# Patient Record
Sex: FEMALE | Race: WHITE | NOT HISPANIC OR LATINO | URBAN - METROPOLITAN AREA
[De-identification: names, ages, dates, MRNs, and addresses within clinical notes are randomized per-mention and may not be internally consistent; named-entity substitution may affect disease eponyms.]

---

## 2020-06-29 ENCOUNTER — APPOINTMENT (OUTPATIENT)
Dept: RADIOLOGY | Facility: MEDICAL CENTER | Age: 35
End: 2020-06-29
Payer: COMMERCIAL

## 2020-06-29 ENCOUNTER — OFFICE VISIT (OUTPATIENT)
Dept: URGENT CARE | Facility: MEDICAL CENTER | Age: 35
End: 2020-06-29
Payer: COMMERCIAL

## 2020-06-29 VITALS
OXYGEN SATURATION: 99 % | RESPIRATION RATE: 20 BRPM | HEART RATE: 77 BPM | TEMPERATURE: 98 F | SYSTOLIC BLOOD PRESSURE: 120 MMHG | HEIGHT: 65 IN | BODY MASS INDEX: 19.99 KG/M2 | DIASTOLIC BLOOD PRESSURE: 85 MMHG | WEIGHT: 120 LBS

## 2020-06-29 DIAGNOSIS — M54.2 NECK PAIN: ICD-10-CM

## 2020-06-29 DIAGNOSIS — R07.81 RIB PAIN ON RIGHT SIDE: ICD-10-CM

## 2020-06-29 DIAGNOSIS — M54.2 NECK PAIN: Primary | ICD-10-CM

## 2020-06-29 PROCEDURE — G0382 LEV 3 HOSP TYPE B ED VISIT: HCPCS | Performed by: PHYSICIAN ASSISTANT

## 2020-06-29 PROCEDURE — 71101 X-RAY EXAM UNILAT RIBS/CHEST: CPT

## 2020-06-29 PROCEDURE — 72040 X-RAY EXAM NECK SPINE 2-3 VW: CPT

## 2020-06-29 RX ORDER — NAPROXEN 500 MG/1
500 TABLET ORAL 2 TIMES DAILY WITH MEALS
Qty: 20 TABLET | Refills: 0 | Status: SHIPPED | OUTPATIENT
Start: 2020-06-29 | End: 2020-07-09

## 2020-06-29 RX ORDER — METHOCARBAMOL 500 MG/1
500 TABLET, FILM COATED ORAL 4 TIMES DAILY
Qty: 20 TABLET | Refills: 0 | Status: SHIPPED | OUTPATIENT
Start: 2020-06-29 | End: 2020-07-04

## 2020-07-07 ENCOUNTER — OFFICE VISIT (OUTPATIENT)
Dept: URGENT CARE | Facility: MEDICAL CENTER | Age: 35
End: 2020-07-07
Payer: COMMERCIAL

## 2020-07-07 VITALS — OXYGEN SATURATION: 96 % | RESPIRATION RATE: 16 BRPM | TEMPERATURE: 100.6 F | HEART RATE: 100 BPM

## 2020-07-07 DIAGNOSIS — R50.9 FEVER, UNSPECIFIED FEVER CAUSE: Primary | ICD-10-CM

## 2020-07-07 DIAGNOSIS — R52 GENERALIZED BODY ACHES: ICD-10-CM

## 2020-07-07 PROCEDURE — U0003 INFECTIOUS AGENT DETECTION BY NUCLEIC ACID (DNA OR RNA); SEVERE ACUTE RESPIRATORY SYNDROME CORONAVIRUS 2 (SARS-COV-2) (CORONAVIRUS DISEASE [COVID-19]), AMPLIFIED PROBE TECHNIQUE, MAKING USE OF HIGH THROUGHPUT TECHNOLOGIES AS DESCRIBED BY CMS-2020-01-R: HCPCS | Performed by: PHYSICIAN ASSISTANT

## 2020-07-07 PROCEDURE — G0382 LEV 3 HOSP TYPE B ED VISIT: HCPCS | Performed by: PHYSICIAN ASSISTANT

## 2020-07-07 NOTE — PROGRESS NOTES
[facility]        NAME: Raisa Tariq is a 38 y o  female  : 1985    MRN: [id number]  DATE: 2020  TIME: 6:15 PM    Assessment and Plan   Fever, unspecified fever cause [R50 9]  1  Fever, unspecified fever cause  MISC COVID-19 TEST- Office Collection   2  Generalized body aches       COVID-19 swab performed due to patient's symptoms  Advised use Tylenol or Motrin for pain or fever and isolate until results available  Patient Instructions   Tylenol or Motrin for pain or fever  Drink plenty of fluids and get rest  Follow up with PCP in 3-5 days  Proceed to  ER if symptoms worsen  Chief Complaint     Chief Complaint   Patient presents with    Fever    Generalized Body Aches         History of Present Illness       Patient is a 53-year-old female who presents today with complaints of fever and body aches for the past week  Patient states she was bit by a spider 10 days ago and area on right thigh did become red but has now cleared up and is no longer painful  She reports neck pain as well as the myalgias  No stiffness  Reports Motrin does control her fever  No sore throat, cough, congestion, shortness of breath  Review of Systems   Review of Systems   Constitutional: Positive for fatigue and fever  Negative for chills  HENT: Negative for congestion and sore throat  Respiratory: Negative for cough and shortness of breath  Cardiovascular: Negative for chest pain  Musculoskeletal: Positive for myalgias and neck pain  Negative for neck stiffness  Skin: Negative for color change, rash and wound           Current Medications       Current Outpatient Medications:     methocarbamol (ROBAXIN) 500 mg tablet, Take 1 tablet (500 mg total) by mouth 4 (four) times a day for 5 days, Disp: 20 tablet, Rfl: 0    naproxen (NAPROSYN) 500 mg tablet, Take 1 tablet (500 mg total) by mouth 2 (two) times a day with meals for 10 days, Disp: 20 tablet, Rfl: 0    Current Allergies Allergies as of 07/07/2020 - Reviewed 06/29/2020   Allergen Reaction Noted    Pollen extract  03/09/2018            The following portions of the patient's history were reviewed and updated as appropriate: allergies, current medications, past family history, past medical history, past social history, past surgical history and problem list      No past medical history on file  No past surgical history on file  No family history on file  Medications have been verified  Objective   Pulse 100   Temp (!) 100 6 °F (38 1 °C)   Resp 16   SpO2 96%        Physical Exam     Physical Exam   Constitutional: She appears well-developed and well-nourished  No distress  HENT:   Head: Normocephalic and atraumatic  Nose: Nose normal    Mouth/Throat: Uvula is midline, oropharynx is clear and moist and mucous membranes are normal  Tonsils are 0 on the right  Tonsils are 0 on the left  No tonsillar exudate  Eyes: Pupils are equal, round, and reactive to light  Conjunctivae are normal    Neck: Neck supple  Muscular tenderness present  No spinous process tenderness present  No neck rigidity  No edema, no erythema and normal range of motion present  Cardiovascular: Normal rate and regular rhythm  Pulmonary/Chest: Effort normal and breath sounds normal    Lymphadenopathy:     She has no cervical adenopathy  Skin: Skin is warm and dry  No lesion and no rash noted

## 2020-07-10 ENCOUNTER — OFFICE VISIT (OUTPATIENT)
Dept: URGENT CARE | Facility: MEDICAL CENTER | Age: 35
End: 2020-07-10
Payer: COMMERCIAL

## 2020-07-10 VITALS
TEMPERATURE: 99.4 F | BODY MASS INDEX: 20.69 KG/M2 | RESPIRATION RATE: 18 BRPM | DIASTOLIC BLOOD PRESSURE: 88 MMHG | HEIGHT: 65 IN | HEART RATE: 104 BPM | SYSTOLIC BLOOD PRESSURE: 124 MMHG | WEIGHT: 124.2 LBS | OXYGEN SATURATION: 95 %

## 2020-07-10 DIAGNOSIS — M54.2 NECK PAIN: ICD-10-CM

## 2020-07-10 DIAGNOSIS — R51.9 ACUTE NONINTRACTABLE HEADACHE, UNSPECIFIED HEADACHE TYPE: Primary | ICD-10-CM

## 2020-07-10 DIAGNOSIS — H53.8 BLURRY VISION: ICD-10-CM

## 2020-07-10 DIAGNOSIS — R42 DIZZINESS: ICD-10-CM

## 2020-07-10 DIAGNOSIS — M43.6 NECK STIFFNESS: ICD-10-CM

## 2020-07-10 DIAGNOSIS — R10.31 RIGHT LOWER QUADRANT ABDOMINAL PAIN: ICD-10-CM

## 2020-07-10 PROCEDURE — G0382 LEV 3 HOSP TYPE B ED VISIT: HCPCS | Performed by: PHYSICIAN ASSISTANT

## 2020-07-10 NOTE — PATIENT INSTRUCTIONS
Discussed concerns with patient at length  Patient is alert, oriented and capable of making own medical decisions  Patient is declining ER transfer at this time  Patient understands the risks of leaving AMA/not going to the ED for further evaluation such as worsening condition, disability, or even death  Patient verbalizes good understanding and continues to decline ER transfer  Patient refused to sign AMA form

## 2020-07-10 NOTE — PROGRESS NOTES
[facility]        NAME: Ines Mcguire is a 38 y o  female  : 1985    MRN: [id number]  DATE: July 10, 2020  TIME: 3:38 PM    Assessment and Plan   Acute nonintractable headache, unspecified headache type [R51]  1  Acute nonintractable headache, unspecified headache type     2  Dizziness     3  Neck pain     4  Neck stiffness     5  Blurry vision     6  Right lower quadrant abdominal pain       She does not know what type of bug bit her/did not see it and did not remove any ticks  Explained multiple concerns at length with patient and urged patient to go to the emergency department for further evaluation and workup  Patient refusing ER transfer  Patient is alert, oriented capable of making her own medical decisions  Again explained concerns at length with patient and recommend ER transfer- She continues to refuse ER transfer at this time  Patient Instructions     Discussed concerns with patient at length  Patient is alert, oriented and capable of making own medical decisions  Patient is declining ER transfer at this time  Patient understands the risks of leaving AMA/not going to the ED for further evaluation such as worsening condition, disability, immobility or even death  Patient verbalizes good understanding and continues to decline ER transfer  Patient refused to sign AMA form  Chief Complaint     Chief Complaint   Patient presents with    Tick Bite     Pt had a tick bite on her inner right thigh 2 weeks ago  Symptoms began shortly after the bite   Headache    Fatigue    Chills    Generalized Body Aches         History of Present Illness       39 y/o female presents with severe headache, dizziness, intermittent blurry vision, generalized body aches, neck pain, neck stiffness, fevers, some intermittent abdominal pain, fatigue and chills  Patient states her headache is severe, 10/10, not improving with ibuprofen    States he has never had a headache like this before, denies any history of headaches  States her neck pain is achy, feels very stiff and pain with bending her neck forward  She states about 2 weeks ago she was hiking and thought she had a bug bite on her right thigh- states she has not seen any ticks, does not know what kind a bug bit her, did not remove any ticks  States the rash completely resolved, denies any further rashes  States she was seen 3 days ago for COVID testing but denies any cough or cold-like symptoms  States that when she started having the fevers the body aches  States the headache has been getting worse since then  Describes the dizziness as feeling off is been having intermittent blurry vision  States occasional lower abdominal pain but denies any nausea, vomiting or diarrhea  Denies any urinary symptoms  Denies any chest pain, chest tightness, wheezing, shortness of breath, eye pain, pain with eye movement, or other complaints      Review of Systems   Review of Systems   Constitutional: Positive for chills, fatigue and fever  Negative for activity change and appetite change  HENT: Negative for congestion, ear pain, facial swelling, postnasal drip, rhinorrhea, sinus pressure, sore throat, trouble swallowing and voice change  Eyes: Positive for visual disturbance  Negative for discharge and itching  Respiratory: Negative for cough, chest tightness, shortness of breath and wheezing  Cardiovascular: Negative for chest pain  Gastrointestinal: Positive for abdominal pain  Negative for diarrhea, nausea and vomiting  Genitourinary: Negative  Musculoskeletal: Positive for myalgias  Negative for back pain and neck pain  Skin: Negative for rash  Neurological: Positive for dizziness and headaches  Negative for syncope, weakness and numbness  All other systems reviewed and are negative          Current Medications       Current Outpatient Medications:     methocarbamol (ROBAXIN) 500 mg tablet, Take 1 tablet (500 mg total) by mouth 4 (four) times a day for 5 days, Disp: 20 tablet, Rfl: 0    naproxen (NAPROSYN) 500 mg tablet, Take 1 tablet (500 mg total) by mouth 2 (two) times a day with meals for 10 days, Disp: 20 tablet, Rfl: 0    Current Allergies     Allergies as of 07/10/2020 - Reviewed 07/10/2020   Allergen Reaction Noted    Pollen extract  03/09/2018            The following portions of the patient's history were reviewed and updated as appropriate: allergies, current medications, past family history, past medical history, past social history, past surgical history and problem list      History reviewed  No pertinent past medical history  History reviewed  No pertinent surgical history  Family History   Problem Relation Age of Onset    Hypothyroidism Mother     No Known Problems Father          Medications have been verified  Objective   /88   Pulse 104   Temp 99 4 °F (37 4 °C) (Temporal)   Resp 18   Ht 5' 4 5" (1 638 m)   Wt 56 3 kg (124 lb 3 2 oz)   LMP 06/24/2020 (Exact Date)   SpO2 95%   BMI 20 99 kg/m²        Physical Exam     Physical Exam   Constitutional: She is oriented to person, place, and time  She appears well-developed and well-nourished  No distress  HENT:   Head: Normocephalic and atraumatic  Right Ear: Tympanic membrane normal    Left Ear: Tympanic membrane normal    Eyes: Pupils are equal, round, and reactive to light  EOM are normal    No nystagmus, no pain with EOMs   Neck: Normal range of motion  Neck supple  No spinous process tenderness present  Pain with forward flexion of the neck   Cardiovascular: Regular rhythm and normal heart sounds  Tachycardia present  Pulmonary/Chest: Effort normal and breath sounds normal  She has no wheezes  Abdominal: Soft  Bowel sounds are normal  There is tenderness in the right lower quadrant  There is no guarding  Neurological: She is alert and oriented to person, place, and time  She has normal reflexes  No sensory deficit   Coordination normal    NV intact with sensation and strong peripheral pulses  5/5 strength of UE/LE bilaterally  No focal neurological deficits  Patient was getting dizzy during Romberg testing   Skin: Capillary refill takes less than 2 seconds  Psychiatric: She has a normal mood and affect  Her behavior is normal    Nursing note and vitals reviewed

## 2020-07-17 LAB — SARS-COV-2 RNA SPEC QL NAA+PROBE: NOT DETECTED

## 2020-07-18 ENCOUNTER — TELEPHONE (OUTPATIENT)
Dept: URGENT CARE | Facility: MEDICAL CENTER | Age: 35
End: 2020-07-18

## 2020-07-20 ENCOUNTER — TELEPHONE (OUTPATIENT)
Dept: OTHER | Facility: OTHER | Age: 35
End: 2020-07-20

## 2020-07-20 NOTE — TELEPHONE ENCOUNTER
The patient was called for notification of a NEGATIVE test result for COVID-19  The patient did not answer the phone and a voicemail was left requesting a call back to [phone], Option 7

## 2023-08-31 ENCOUNTER — OFFICE VISIT (OUTPATIENT)
Dept: GYNECOLOGY | Facility: CLINIC | Age: 38
End: 2023-08-31
Payer: COMMERCIAL

## 2023-08-31 VITALS
BODY MASS INDEX: 21.73 KG/M2 | SYSTOLIC BLOOD PRESSURE: 120 MMHG | WEIGHT: 130.4 LBS | HEIGHT: 65 IN | DIASTOLIC BLOOD PRESSURE: 78 MMHG

## 2023-08-31 DIAGNOSIS — Z12.31 ENCOUNTER FOR SCREENING MAMMOGRAM FOR MALIGNANT NEOPLASM OF BREAST: ICD-10-CM

## 2023-08-31 DIAGNOSIS — Z01.419 ROUTINE GYNECOLOGICAL EXAMINATION: Primary | ICD-10-CM

## 2023-08-31 PROCEDURE — G0145 SCR C/V CYTO,THINLAYER,RESCR: HCPCS | Performed by: OBSTETRICS & GYNECOLOGY

## 2023-08-31 PROCEDURE — S0610 ANNUAL GYNECOLOGICAL EXAMINA: HCPCS | Performed by: OBSTETRICS & GYNECOLOGY

## 2023-08-31 NOTE — PROGRESS NOTES
Assessment/Plan:    Normal breast and GYN exam  Normal Pap smear 2018  Status post spontaneous vaginal delivery   Regular menstrual cycles    Plan: Check Pap smear. Continue healthy diet and exercise. Recommend daily Kegels. Subjective:   2018 boy,  girl     Patient ID: Gregor Cool is a 43 y.o. female presents to the office as a new patient with no complaints. Regular menstrual cycles. Spontaneous vaginal delivery 2022. Recently finished breast-feeding. Not interested in any birth control. Denies any pelvic pain abnormal bleeding or dyspareunia. Denies any breast bowel or bladder issues. No change in family history. Paternal aunt (uterine cancer. Medications reviewed. Working full-time as a private physical therapist.  Exercising regularly and eating extremely healthy. Review of Systems   Constitutional: Negative. Negative for fatigue, fever and unexpected weight change. HENT: Negative. Eyes: Negative. Respiratory: Negative. Negative for chest tightness, shortness of breath, wheezing and stridor. Cardiovascular: Negative. Negative for chest pain, palpitations and leg swelling. Gastrointestinal: Negative. Negative for abdominal pain, blood in stool, diarrhea, nausea, rectal pain and vomiting. Endocrine: Negative. Genitourinary: Negative for dysuria, frequency, vaginal bleeding, vaginal discharge and vaginal pain. Musculoskeletal: Negative. Skin: Negative. Allergic/Immunologic: Negative. Neurological: Negative. Hematological: Negative. Psychiatric/Behavioral: Negative. All other systems reviewed and are negative. Objective: There were no vitals taken for this visit. Physical Exam  Constitutional:       Appearance: She is well-developed. HENT:      Head: Normocephalic and atraumatic. Neck:      Thyroid: No thyromegaly. Trachea: No tracheal deviation.    Cardiovascular:      Rate and Rhythm: Normal rate and regular rhythm. Heart sounds: Normal heart sounds. Pulmonary:      Effort: Pulmonary effort is normal. No respiratory distress. Breath sounds: Normal breath sounds. No stridor. No wheezing or rales. Chest:      Chest wall: No tenderness. Breasts:     Breasts are symmetrical.      Right: No inverted nipple, mass, nipple discharge, skin change or tenderness. Left: No inverted nipple, mass, nipple discharge, skin change or tenderness. Abdominal:      General: Bowel sounds are normal. There is no distension. Palpations: Abdomen is soft. There is no mass. Tenderness: There is no abdominal tenderness. There is no guarding or rebound. Hernia: No hernia is present. There is no hernia in the left inguinal area. Genitourinary:     Labia:         Right: No rash, tenderness, lesion or injury. Left: No rash, tenderness, lesion or injury. Vagina: Normal. No signs of injury and foreign body. No vaginal discharge, erythema, tenderness or bleeding. Cervix: No cervical motion tenderness, discharge or friability. Uterus: Not deviated, not enlarged, not fixed and not tender. Adnexa:         Right: No mass, tenderness or fullness. Left: No mass, tenderness or fullness. Rectum: No mass, anal fissure, external hemorrhoid or internal hemorrhoid. Musculoskeletal:      Cervical back: Normal range of motion and neck supple. Lymphadenopathy:      Lower Body: No right inguinal adenopathy. No left inguinal adenopathy. Skin:     General: Skin is warm and dry. Neurological:      Mental Status: She is alert and oriented to person, place, and time. Psychiatric:         Behavior: Behavior normal.         Thought Content:  Thought content normal.         Judgment: Judgment normal.

## 2023-09-01 ENCOUNTER — NURSE TRIAGE (OUTPATIENT)
Dept: OTHER | Facility: OTHER | Age: 38
End: 2023-09-01

## 2023-09-01 NOTE — TELEPHONE ENCOUNTER
Regarding: Post Pap Fever, chills and cramps  ----- Message from Ken Shultz sent at 9/1/2023  5:03 PM EDT -----  "My daughter was seen in the office yesterday and had a pap smear,  now she is having uterine pain, low grade fever and strong uterine cramps and chills"

## 2023-09-11 LAB
LAB AP GYN PRIMARY INTERPRETATION: NORMAL
Lab: NORMAL

## 2023-09-15 ENCOUNTER — TELEPHONE (OUTPATIENT)
Dept: GYNECOLOGY | Facility: CLINIC | Age: 38
End: 2023-09-15

## 2023-09-15 NOTE — TELEPHONE ENCOUNTER
The patient called because after she had her pap smear on 8/31 and had uterine pain and swelling, as well as chills and a low grade fever. She said she was diagnosed with a uterine infection the next day and prescribed doxycycline and metronidazole. She had finished both medications. The patient is still having uterine soreness and wanted to know if you could send her for a pelvic ultrasound? She has no vaginal itching, irritation, discharge or bleeding.

## 2024-05-02 ENCOUNTER — HOSPITAL ENCOUNTER (OUTPATIENT)
Dept: RADIOLOGY | Facility: MEDICAL CENTER | Age: 39
Discharge: HOME | End: 2024-05-02
Payer: COMMERCIAL

## 2024-05-02 VITALS — BODY MASS INDEX: 22.2 KG/M2 | HEIGHT: 64 IN | WEIGHT: 130 LBS

## 2024-05-02 DIAGNOSIS — Z12.31 ENCOUNTER FOR SCREENING MAMMOGRAM FOR MALIGNANT NEOPLASM OF BREAST: ICD-10-CM

## 2024-05-02 PROCEDURE — 77067 SCR MAMMO BI INCL CAD: CPT

## 2024-05-02 PROCEDURE — 77063 BREAST TOMOSYNTHESIS BI: CPT

## 2024-07-31 ENCOUNTER — HOSPITAL ENCOUNTER (OUTPATIENT)
Dept: ULTRASOUND IMAGING | Facility: CLINIC | Age: 39
Discharge: HOME | End: 2024-07-31
Payer: COMMERCIAL

## 2024-07-31 ENCOUNTER — HOSPITAL ENCOUNTER (OUTPATIENT)
Dept: MAMMOGRAPHY | Facility: CLINIC | Age: 39
Discharge: HOME | End: 2024-07-31
Payer: COMMERCIAL

## 2024-07-31 VITALS — HEIGHT: 64 IN | WEIGHT: 130 LBS | BODY MASS INDEX: 22.2 KG/M2

## 2024-07-31 DIAGNOSIS — R92.8 ABNORMAL MAMMOGRAM: ICD-10-CM

## 2024-07-31 PROCEDURE — 77065 DX MAMMO INCL CAD UNI: CPT

## 2024-07-31 PROCEDURE — 76642 ULTRASOUND BREAST LIMITED: CPT

## 2024-07-31 PROCEDURE — G0279 TOMOSYNTHESIS, MAMMO: HCPCS

## 2025-05-29 ENCOUNTER — OFFICE VISIT (OUTPATIENT)
Dept: FAMILY MEDICINE CLINIC | Facility: CLINIC | Age: 40
End: 2025-05-29
Payer: COMMERCIAL

## 2025-05-29 VITALS
OXYGEN SATURATION: 100 % | BODY MASS INDEX: 24.63 KG/M2 | WEIGHT: 143.5 LBS | TEMPERATURE: 98.3 F | RESPIRATION RATE: 16 BRPM | DIASTOLIC BLOOD PRESSURE: 78 MMHG | SYSTOLIC BLOOD PRESSURE: 112 MMHG | HEART RATE: 83 BPM

## 2025-05-29 DIAGNOSIS — Z13.6 SCREENING FOR CARDIOVASCULAR CONDITION: ICD-10-CM

## 2025-05-29 DIAGNOSIS — Z83.49 FAMILY HISTORY OF THYROID DISEASE IN MOTHER: ICD-10-CM

## 2025-05-29 DIAGNOSIS — Z00.00 ANNUAL PHYSICAL EXAM: Primary | ICD-10-CM

## 2025-05-29 PROCEDURE — 99386 PREV VISIT NEW AGE 40-64: CPT

## 2025-05-29 RX ORDER — DIPHENOXYLATE HYDROCHLORIDE AND ATROPINE SULFATE 2.5; .025 MG/1; MG/1
1 TABLET ORAL DAILY
COMMUNITY

## 2025-05-29 NOTE — PROGRESS NOTES
Adult Annual Physical  Name: Osiris Mahmood      : 1985      MRN: [id number]  Encounter Provider: Cami Barriga DO  Encounter Date: 2025   Encounter department: [facility]    :  Assessment & Plan  Annual physical exam  - screening for cardiovascular disease: cbc, bmp, lipid, tsh  - screening for breast cancer: mammogram 2024, reports dense tissue and would like to check every 2 years, next in 2026  - screening for cervical cancer: last pap   - immunizations: UTD  - counseled pt on lifestyle modifications such as diet changes and 150 mins/weekly of moderate intensity exercise     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       Family history of thyroid disease in mother  Mom with hypothyroidism.   Orders:    TSH, 3rd generation with Free T4 reflex; Future    CBC and differential; Future    Screening for cardiovascular condition    Orders:    Basic metabolic panel; Future    Lipid panel; Future        Preventive Screenings:  - Diabetes Screening: orders placed and risks/benefits discussed  - Cholesterol Screening: orders placed and risks/benefits discussed   - Cervical cancer screening: screening up-to-date   - Breast cancer screening: screening up-to-date   - Colon cancer screening: screening not indicated   - Lung cancer screening: screening not indicated     Counseling/Anticipatory Guidance:    - Sexual health: discussed sexually transmitted diseases, partner selection, use of condoms, avoidance of unintended pregnancy, and contraceptive alternatives.   - Diet: discussed recommendations for a healthy/well-balanced diet.   - Exercise: the importance of regular exercise/physical activity was discussed. Recommend exercise 3-5 times per week for at least 30 minutes.   - Injury prevention: discussed safety/seat belts, safety helmets, smoke detectors, carbon monoxide detectors, and smoking near bedding or upholstery.          History of Present Illness     Adult Annual  Physical:  Patient presents for annual physical. Here to establish care.  Reports that she had preeclampsia with her first pregnancy.  Mom has a history of thyroid disease and dad may be prediabetic. Reports having significant pain and discomfort and pelvic inflam disease with her last pap smear back in 2023 and is reluctant to pursue further. Can discuss when she is next due.  .     Diet and Physical Activity:  - Diet/Nutrition: no special diet, consuming 3-5 servings of fruits/vegetables daily, adequate whole grain intake, adequate fiber intake, well balanced diet and limited junk food.  - Exercise: no formal exercise. active with kids    Depression Screening:  - PHQ-2 Score: 0    General Health:  - Sleep: sleeps well.  - Hearing: normal hearing bilateral ears.  - Vision: no vision problems, wears glasses and contacts and most recent eye exam > 1 year ago. eye doctor every 1-2 years  - Dental: regular dental visits, brushes teeth twice daily and floss regularly.    /GYN Health:  - Follows with GYN: yes.   - Menopause: premenopausal.   - Last menstrual cycle: 5/12/2025.   - History of STDs: yes  - Contraception:. has had HPV in the past; no birth control      Review of Systems   Constitutional:  Negative for chills and fever.   HENT:  Negative for congestion and rhinorrhea.    Eyes:  Negative for visual disturbance.   Respiratory:  Negative for cough and shortness of breath.    Cardiovascular:  Negative for chest pain and palpitations.   Gastrointestinal:  Negative for abdominal pain, constipation, diarrhea, nausea and vomiting.   Genitourinary:  Negative for dysuria.   Musculoskeletal:  Negative for arthralgias.   Skin:  Negative for rash.   Neurological:  Negative for dizziness, light-headedness and headaches.     Medications Ordered Prior to Encounter[1]     Objective   /78 (BP Location: Left arm, Patient Position: Sitting, Cuff Size: Large)   Pulse 83   Temp 98.3 °F (36.8 °C) (Temporal)   Resp 16    Wt 65.1 kg (143 lb 8 oz)   SpO2 100%   BMI 24.63 kg/m²     Physical Exam  Vitals reviewed.   Constitutional:       Appearance: Normal appearance.   HENT:      Head: Normocephalic and atraumatic.      Right Ear: External ear normal.      Left Ear: External ear normal.      Nose: Nose normal.      Mouth/Throat:      Pharynx: Oropharynx is clear.     Eyes:      Extraocular Movements: Extraocular movements intact.      Conjunctiva/sclera: Conjunctivae normal.       Cardiovascular:      Rate and Rhythm: Normal rate and regular rhythm.      Pulses: Normal pulses.      Heart sounds: Normal heart sounds.   Pulmonary:      Effort: Pulmonary effort is normal.      Breath sounds: Normal breath sounds.   Abdominal:      Palpations: Abdomen is soft.     Musculoskeletal:      Cervical back: Neck supple.      Right lower leg: No edema.      Left lower leg: No edema.     Skin:     General: Skin is warm.     Neurological:      Mental Status: She is alert and oriented to person, place, and time.     Psychiatric:         Mood and Affect: Mood normal.         Behavior: Behavior normal.         Thought Content: Thought content normal.         Judgment: Judgment normal.              [1]   Current Outpatient Medications on File Prior to Visit   Medication Sig Dispense Refill    Cholecalciferol 50 MCG (2000 UT) CAPS Take 1 capsule by mouth      multivitamin (THERAGRAN) TABS Take 1 tablet by mouth daily       No current facility-administered medications on file prior to visit.

## 2025-06-04 LAB
BASOPHILS # BLD AUTO: 17 CELLS/UL (ref 0–200)
BASOPHILS NFR BLD AUTO: 0.3 %
BUN SERPL-MCNC: 17 MG/DL (ref 7–25)
BUN/CREAT SERPL: NORMAL (CALC) (ref 6–22)
CALCIUM SERPL-MCNC: 9.5 MG/DL (ref 8.6–10.2)
CHLORIDE SERPL-SCNC: 105 MMOL/L (ref 98–110)
CHOLEST SERPL-MCNC: 143 MG/DL
CHOLEST/HDLC SERPL: 2.6 (CALC)
CO2 SERPL-SCNC: 27 MMOL/L (ref 20–32)
CREAT SERPL-MCNC: 0.7 MG/DL (ref 0.5–0.99)
EOSINOPHIL # BLD AUTO: 58 CELLS/UL (ref 15–500)
EOSINOPHIL NFR BLD AUTO: 1 %
ERYTHROCYTE [DISTWIDTH] IN BLOOD BY AUTOMATED COUNT: 11.7 % (ref 11–15)
GFR/BSA.PRED SERPLBLD CYS-BASED-ARV: 111 ML/MIN/1.73M2
GLUCOSE SERPL-MCNC: 85 MG/DL (ref 65–99)
HCT VFR BLD AUTO: 39.7 % (ref 35–45)
HDLC SERPL-MCNC: 56 MG/DL
HGB BLD-MCNC: 13.5 G/DL (ref 11.7–15.5)
LDLC SERPL CALC-MCNC: 72 MG/DL (CALC)
LYMPHOCYTES # BLD AUTO: 1821 CELLS/UL (ref 850–3900)
LYMPHOCYTES NFR BLD AUTO: 31.4 %
MCH RBC QN AUTO: 32.3 PG (ref 27–33)
MCHC RBC AUTO-ENTMCNC: 34 G/DL (ref 32–36)
MCV RBC AUTO: 95 FL (ref 80–100)
MONOCYTES # BLD AUTO: 505 CELLS/UL (ref 200–950)
MONOCYTES NFR BLD AUTO: 8.7 %
NEUTROPHILS # BLD AUTO: 3399 CELLS/UL (ref 1500–7800)
NEUTROPHILS NFR BLD AUTO: 58.6 %
NONHDLC SERPL-MCNC: 87 MG/DL (CALC)
PLATELET # BLD AUTO: 278 THOUSAND/UL (ref 140–400)
PMV BLD REES-ECKER: 9.4 FL (ref 7.5–12.5)
POTASSIUM SERPL-SCNC: 4.1 MMOL/L (ref 3.5–5.3)
RBC # BLD AUTO: 4.18 MILLION/UL (ref 3.8–5.1)
SODIUM SERPL-SCNC: 139 MMOL/L (ref 135–146)
TRIGL SERPL-MCNC: 70 MG/DL
TSH SERPL-ACNC: 2.45 MIU/L
WBC # BLD AUTO: 5.8 THOUSAND/UL (ref 3.8–10.8)

## 2025-06-05 ENCOUNTER — RESULTS FOLLOW-UP (OUTPATIENT)
Dept: FAMILY MEDICINE CLINIC | Facility: CLINIC | Age: 40
End: 2025-06-05